# Patient Record
Sex: FEMALE | Race: AMERICAN INDIAN OR ALASKA NATIVE | Employment: FULL TIME | URBAN - METROPOLITAN AREA
[De-identification: names, ages, dates, MRNs, and addresses within clinical notes are randomized per-mention and may not be internally consistent; named-entity substitution may affect disease eponyms.]

---

## 2020-12-30 ENCOUNTER — APPOINTMENT (OUTPATIENT)
Dept: RADIOLOGY | Facility: IMAGING CENTER | Age: 55
End: 2020-12-30
Attending: PHYSICIAN ASSISTANT
Payer: OTHER MISCELLANEOUS

## 2020-12-30 ENCOUNTER — OFFICE VISIT (OUTPATIENT)
Dept: URGENT CARE | Facility: CLINIC | Age: 55
End: 2020-12-30
Payer: OTHER MISCELLANEOUS

## 2020-12-30 VITALS
HEART RATE: 87 BPM | BODY MASS INDEX: 29.61 KG/M2 | TEMPERATURE: 98.8 F | HEIGHT: 62 IN | RESPIRATION RATE: 14 BRPM | DIASTOLIC BLOOD PRESSURE: 70 MMHG | SYSTOLIC BLOOD PRESSURE: 146 MMHG | OXYGEN SATURATION: 93 % | WEIGHT: 160.9 LBS

## 2020-12-30 DIAGNOSIS — R06.02 SHORTNESS OF BREATH: ICD-10-CM

## 2020-12-30 DIAGNOSIS — R05.9 COUGH: ICD-10-CM

## 2020-12-30 PROCEDURE — 71046 X-RAY EXAM CHEST 2 VIEWS: CPT | Mod: TC | Performed by: PHYSICIAN ASSISTANT

## 2020-12-30 PROCEDURE — 99204 OFFICE O/P NEW MOD 45 MIN: CPT | Performed by: PHYSICIAN ASSISTANT

## 2020-12-30 RX ORDER — ALBUTEROL SULFATE 90 UG/1
2 AEROSOL, METERED RESPIRATORY (INHALATION) EVERY 6 HOURS PRN
Qty: 8.5 G | Refills: 0 | Status: SHIPPED | OUTPATIENT
Start: 2020-12-30

## 2020-12-30 RX ORDER — BENZONATATE 100 MG/1
100 CAPSULE ORAL 3 TIMES DAILY PRN
Qty: 21 CAP | Refills: 0 | Status: SHIPPED | OUTPATIENT
Start: 2020-12-30

## 2020-12-30 SDOH — HEALTH STABILITY: MENTAL HEALTH: HOW OFTEN DO YOU HAVE A DRINK CONTAINING ALCOHOL?: MONTHLY OR LESS

## 2020-12-30 ASSESSMENT — ENCOUNTER SYMPTOMS
COUGH: 1
MUSCULOSKELETAL NEGATIVE: 1
SPUTUM PRODUCTION: 0
DIZZINESS: 0
HEMOPTYSIS: 0
SORE THROAT: 1
VOMITING: 0
WHEEZING: 1
CHILLS: 0
ABDOMINAL PAIN: 0
SHORTNESS OF BREATH: 1
DIARRHEA: 0
FEVER: 0
NAUSEA: 0

## 2020-12-30 NOTE — PROGRESS NOTES
Subjective:   Olinda Breen is a 55 y.o. female who presents for Asthma (cough , chest tightness, started 6 weeks ago)      HPI    Onset of symptoms 6 weeks. Waxing and waning symptoms.   Cough. Dry cough.   Has not been evaluated for this before.   Sore throat.   Mild chest pain intermittently with coughing.   Chest tightness and SOB occasionally. She states she has a history of asthma.   Nasal congestion, runny nose.   Nyquil, Robitussin with relief of symptoms.   She does not have an albuterol inhaler. She has not tried any allergy medications.   Denies any fever, chills, trouble swallowing, abdominal pain, nausea, vomiting, diarrhea, loss of taste or smell.   No known exposure to COVID-19.   Non-smoker. Denies any other pertinent past medical history.   Awaiting COVID-19 test result from work. She works at a senior living facility.       Review of Systems   Constitutional: Negative for chills, fever and malaise/fatigue.   HENT: Positive for congestion and sore throat.    Respiratory: Positive for cough, shortness of breath and wheezing. Negative for hemoptysis and sputum production.    Cardiovascular: Positive for chest pain.   Gastrointestinal: Negative for abdominal pain, diarrhea, nausea and vomiting.   Musculoskeletal: Negative.    Skin: Negative.    Neurological: Negative for dizziness.   All other systems reviewed and are negative.      Medications:    • This patient does not have an active medication from one of the medication groupers.    Allergies: Morphine and Penicillins    Problem List: Olinda Breen does not have a problem list on file.    Surgical History:  No past surgical history on file.    Past Social Hx: Olinda Breen  reports that she has never smoked. She has never used smokeless tobacco. She reports current alcohol use. She reports that she does not use drugs.     Past Family Hx:  Olinda Breen family history is not on file.     Problem list, medications, and allergies reviewed by myself today in  "Epic.     Objective:     /70   Pulse 87   Temp 37.1 °C (98.8 °F)   Resp 14   Ht 1.575 m (5' 2\")   Wt 73 kg (160 lb 14.4 oz)   SpO2 93%   BMI 29.43 kg/m²     Physical Exam  Vitals signs reviewed.   Constitutional:       General: She is not in acute distress.     Appearance: Normal appearance. She is not ill-appearing or toxic-appearing.   HENT:      Mouth/Throat:      Mouth: Mucous membranes are moist.      Pharynx: Oropharynx is clear. Uvula midline. Posterior oropharyngeal erythema present. No pharyngeal swelling, oropharyngeal exudate or uvula swelling.      Tonsils: No tonsillar exudate or tonsillar abscesses.   Eyes:      Conjunctiva/sclera: Conjunctivae normal.      Pupils: Pupils are equal, round, and reactive to light.   Neck:      Musculoskeletal: Neck supple.   Cardiovascular:      Rate and Rhythm: Normal rate and regular rhythm.      Heart sounds: Normal heart sounds.   Pulmonary:      Effort: Pulmonary effort is normal. No accessory muscle usage or respiratory distress.      Breath sounds: Wheezing present. No decreased breath sounds, rhonchi or rales.      Comments: Minimal expiratory wheezes to left mid lung   Lymphadenopathy:      Cervical: No cervical adenopathy.   Skin:     General: Skin is warm and dry.   Neurological:      General: No focal deficit present.      Mental Status: She is alert and oriented to person, place, and time.   Psychiatric:         Mood and Affect: Mood normal.         Behavior: Behavior normal.       DX: Chest   COMPARISON:  None available.     FINDINGS:  Cardiomediastinal contour is within normal limits.  Minimal linear opacity LEFT lung base.  No pleural fluid collection or pneumothorax.  Mild degenerative change of thoracic spine.     IMPRESSION:     1.  Minimal LEFT lung base atelectasis.  2.  No lobar pneumonia or pulmonary edema.      Assessment/Plan:     Diagnosis and associated orders:     1. Cough  DX-CHEST-2 VIEWS    albuterol 108 (90 Base) MCG/ACT Aero " Soln inhalation aerosol    benzonatate (TESSALON) 100 MG Cap   2. Shortness of breath  DX-CHEST-2 VIEWS    albuterol 108 (90 Base) MCG/ACT Aero Soln inhalation aerosol    benzonatate (TESSALON) 100 MG Cap      Comments/MDM:     Results per radiologist interpretation above. I agree with radiologist.   Discussed with patient signs and symptoms most likely are due to a viral etiology or reactive airway disease post viral infection.  Possible COVID-19.  She is awaiting a COVID-19 test result from her work. She works at senior living.   Symptomatic and supportive care:   Plenty of oral hydration and rest   Over the counter cough suppressant as directed.  Tylenol or ibuprofen for pain and fever as directed.   Saline nasal spray and Flonase as a decongestant.   Infection control measures at home. Stay away from people, Hand washing, covering sneeze/cough, disinfect surfaces.   Remain home from work, school, and other populated environments. Work note provided with information of quarantine measures per CDC guidelines.   Overall, the patient is well-appearing in no respiratory distress.  Instructed to present to the emergency room if any worsening shortness of breath, chest pain, or any other concerns.       Red flags discussed and indications to immediately call 911 or present to the Emergency Department.   Supportive care, differential diagnoses, and indications for immediate follow-up discussed with patient.    Patient expresses understanding and agrees to plan. Patient denies any other questions or concerns.     Advised the patient to follow-up with the primary care physician for recheck, reevaluation, and consideration of further management.    Please note that this dictation was created using voice recognition software. I have made a reasonable attempt to correct obvious errors, but I expect that there are errors of grammar and possibly content that I did not discover before finalizing the note.    This note was  electronically signed by Khalif Little PA-C

## 2021-01-02 ENCOUNTER — TELEPHONE (OUTPATIENT)
Dept: URGENT CARE | Facility: CLINIC | Age: 56
End: 2021-01-02

## 2021-01-02 NOTE — TELEPHONE ENCOUNTER
VOICEMAIL  1. Caller Name: Olinda                      Call Back Number: [phone] (home)     2. Message: Olinda called, she stated that her throat is not getting any better even with the medication you prescribed her on 12/30/2020. She doesn't want to come and be evaluated by the provider that is here today since her insurance is out of network with us.    She is hoping that you could prescribe her something else and knows it may be a few days before she could get a response?    3. Patient approves office to leave a detailed voicemail/MyChart message: yes

## 2021-01-04 ENCOUNTER — TELEPHONE (OUTPATIENT)
Dept: CARDIOLOGY | Facility: PHYSICIAN GROUP | Age: 56
End: 2021-01-04

## 2021-01-04 NOTE — TELEPHONE ENCOUNTER
Attempted to call patient back regarding her continued sore throat.     Left voicemail that she can try over-the-counter Cepacol throat lozenges per 's instructions.  She also can try warm salt water gargles, Tylenol and/or ibuprofen.     If symptoms or not improving in the next couple days or any worsening, I recommend she return to the urgent care for reevaluation.    If any questions instructed to call the number provided.